# Patient Record
Sex: MALE | Race: WHITE | NOT HISPANIC OR LATINO
[De-identification: names, ages, dates, MRNs, and addresses within clinical notes are randomized per-mention and may not be internally consistent; named-entity substitution may affect disease eponyms.]

---

## 2018-07-24 ENCOUNTER — APPOINTMENT (OUTPATIENT)
Dept: UROLOGY | Facility: CLINIC | Age: 73
End: 2018-07-24
Payer: MEDICARE

## 2018-07-24 ENCOUNTER — OUTPATIENT (OUTPATIENT)
Dept: OUTPATIENT SERVICES | Facility: HOSPITAL | Age: 73
LOS: 1 days | End: 2018-07-24
Payer: MEDICARE

## 2018-07-24 VITALS
HEIGHT: 75 IN | HEART RATE: 76 BPM | DIASTOLIC BLOOD PRESSURE: 63 MMHG | BODY MASS INDEX: 31.71 KG/M2 | WEIGHT: 255 LBS | RESPIRATION RATE: 17 BRPM | SYSTOLIC BLOOD PRESSURE: 136 MMHG

## 2018-07-24 DIAGNOSIS — N13.9 OBSTRUCTIVE AND REFLUX UROPATHY, UNSPECIFIED: ICD-10-CM

## 2018-07-24 DIAGNOSIS — Z78.9 OTHER SPECIFIED HEALTH STATUS: ICD-10-CM

## 2018-07-24 PROCEDURE — 99203 OFFICE O/P NEW LOW 30 MIN: CPT | Mod: 25

## 2018-07-24 PROCEDURE — 51702 INSERT TEMP BLADDER CATH: CPT

## 2018-08-01 DIAGNOSIS — N40.1 BENIGN PROSTATIC HYPERPLASIA WITH LOWER URINARY TRACT SYMPTOMS: ICD-10-CM

## 2018-08-01 DIAGNOSIS — N13.9 OBSTRUCTIVE AND REFLUX UROPATHY, UNSPECIFIED: ICD-10-CM

## 2018-08-01 DIAGNOSIS — R35.0 FREQUENCY OF MICTURITION: ICD-10-CM

## 2018-08-01 DIAGNOSIS — R33.9 RETENTION OF URINE, UNSPECIFIED: ICD-10-CM

## 2018-08-01 DIAGNOSIS — N18.4 CHRONIC KIDNEY DISEASE, STAGE 4 (SEVERE): ICD-10-CM

## 2018-08-21 ENCOUNTER — APPOINTMENT (OUTPATIENT)
Dept: UROLOGY | Facility: CLINIC | Age: 73
End: 2018-08-21
Payer: MEDICARE

## 2018-08-21 DIAGNOSIS — N18.4 CHRONIC KIDNEY DISEASE, STAGE 4 (SEVERE): ICD-10-CM

## 2018-08-21 PROCEDURE — 99214 OFFICE O/P EST MOD 30 MIN: CPT

## 2018-08-30 PROBLEM — N18.4 CKD (CHRONIC KIDNEY DISEASE), STAGE IV: Status: ACTIVE | Noted: 2018-07-24

## 2018-08-31 ENCOUNTER — OUTPATIENT (OUTPATIENT)
Dept: OUTPATIENT SERVICES | Facility: HOSPITAL | Age: 73
LOS: 1 days | End: 2018-08-31
Payer: MEDICARE

## 2018-08-31 VITALS
HEART RATE: 76 BPM | TEMPERATURE: 98 F | RESPIRATION RATE: 16 BRPM | OXYGEN SATURATION: 98 % | SYSTOLIC BLOOD PRESSURE: 148 MMHG | HEIGHT: 75 IN | WEIGHT: 246.04 LBS | DIASTOLIC BLOOD PRESSURE: 80 MMHG

## 2018-08-31 DIAGNOSIS — Z87.19 PERSONAL HISTORY OF OTHER DISEASES OF THE DIGESTIVE SYSTEM: Chronic | ICD-10-CM

## 2018-08-31 DIAGNOSIS — Z92.89 PERSONAL HISTORY OF OTHER MEDICAL TREATMENT: ICD-10-CM

## 2018-08-31 DIAGNOSIS — H91.90 UNSPECIFIED HEARING LOSS, UNSPECIFIED EAR: ICD-10-CM

## 2018-08-31 DIAGNOSIS — N40.1 BENIGN PROSTATIC HYPERPLASIA WITH LOWER URINARY TRACT SYMPTOMS: ICD-10-CM

## 2018-08-31 DIAGNOSIS — F17.200 NICOTINE DEPENDENCE, UNSPECIFIED, UNCOMPLICATED: ICD-10-CM

## 2018-08-31 DIAGNOSIS — E66.9 OBESITY, UNSPECIFIED: ICD-10-CM

## 2018-08-31 DIAGNOSIS — Z87.438 PERSONAL HISTORY OF OTHER DISEASES OF MALE GENITAL ORGANS: Chronic | ICD-10-CM

## 2018-08-31 DIAGNOSIS — J44.9 CHRONIC OBSTRUCTIVE PULMONARY DISEASE, UNSPECIFIED: ICD-10-CM

## 2018-08-31 LAB
APPEARANCE UR: SIGNIFICANT CHANGE UP
BACTERIA # UR AUTO: NEGATIVE — SIGNIFICANT CHANGE UP
BILIRUB UR-MCNC: NEGATIVE — SIGNIFICANT CHANGE UP
BLD GP AB SCN SERPL QL: NEGATIVE — SIGNIFICANT CHANGE UP
BLOOD UR QL VISUAL: HIGH
BUN SERPL-MCNC: 40 MG/DL — HIGH (ref 7–23)
CALCIUM SERPL-MCNC: 9.4 MG/DL — SIGNIFICANT CHANGE UP (ref 8.4–10.5)
CHLORIDE SERPL-SCNC: 104 MMOL/L — SIGNIFICANT CHANGE UP (ref 98–107)
CO2 SERPL-SCNC: 21 MMOL/L — LOW (ref 22–31)
COLOR SPEC: SIGNIFICANT CHANGE UP
CREAT SERPL-MCNC: 3.09 MG/DL — HIGH (ref 0.5–1.3)
GLUCOSE SERPL-MCNC: 92 MG/DL — SIGNIFICANT CHANGE UP (ref 70–99)
GLUCOSE UR-MCNC: NEGATIVE — SIGNIFICANT CHANGE UP
HCT VFR BLD CALC: 33.2 % — LOW (ref 39–50)
HGB BLD-MCNC: 10.3 G/DL — LOW (ref 13–17)
HYALINE CASTS # UR AUTO: NEGATIVE — SIGNIFICANT CHANGE UP
KETONES UR-MCNC: NEGATIVE — SIGNIFICANT CHANGE UP
LEUKOCYTE ESTERASE UR-ACNC: SIGNIFICANT CHANGE UP
MCHC RBC-ENTMCNC: 28.7 PG — SIGNIFICANT CHANGE UP (ref 27–34)
MCHC RBC-ENTMCNC: 31 % — LOW (ref 32–36)
MCV RBC AUTO: 92.5 FL — SIGNIFICANT CHANGE UP (ref 80–100)
NITRITE UR-MCNC: POSITIVE — HIGH
NRBC # FLD: 0 — SIGNIFICANT CHANGE UP
PH UR: 6 — SIGNIFICANT CHANGE UP (ref 5–8)
PLATELET # BLD AUTO: 236 K/UL — SIGNIFICANT CHANGE UP (ref 150–400)
PMV BLD: 10.1 FL — SIGNIFICANT CHANGE UP (ref 7–13)
POTASSIUM SERPL-MCNC: 5.1 MMOL/L — SIGNIFICANT CHANGE UP (ref 3.5–5.3)
POTASSIUM SERPL-SCNC: 5.1 MMOL/L — SIGNIFICANT CHANGE UP (ref 3.5–5.3)
PROT UR-MCNC: SIGNIFICANT CHANGE UP
RBC # BLD: 3.59 M/UL — LOW (ref 4.2–5.8)
RBC # FLD: 13 % — SIGNIFICANT CHANGE UP (ref 10.3–14.5)
RBC CASTS # UR COMP ASSIST: HIGH (ref 0–?)
RH IG SCN BLD-IMP: POSITIVE — SIGNIFICANT CHANGE UP
SODIUM SERPL-SCNC: 140 MMOL/L — SIGNIFICANT CHANGE UP (ref 135–145)
SP GR SPEC: 1.02 — SIGNIFICANT CHANGE UP (ref 1–1.04)
SQUAMOUS # UR AUTO: SIGNIFICANT CHANGE UP
UROBILINOGEN FLD QL: NORMAL — SIGNIFICANT CHANGE UP
WBC # BLD: 8.46 K/UL — SIGNIFICANT CHANGE UP (ref 3.8–10.5)
WBC # FLD AUTO: 8.46 K/UL — SIGNIFICANT CHANGE UP (ref 3.8–10.5)
WBC UR QL: >50 — HIGH (ref 0–?)

## 2018-08-31 PROCEDURE — 93010 ELECTROCARDIOGRAM REPORT: CPT

## 2018-08-31 RX ORDER — SODIUM CHLORIDE 9 MG/ML
3 INJECTION INTRAMUSCULAR; INTRAVENOUS; SUBCUTANEOUS EVERY 8 HOURS
Qty: 0 | Refills: 0 | Status: DISCONTINUED | OUTPATIENT
Start: 2018-09-21 | End: 2018-09-23

## 2018-08-31 NOTE — H&P PST ADULT - NEGATIVE GENERAL GENITOURINARY SYMPTOMS
no bladder infections/no renal colic/no hematuria/no flank pain L/no flank pain R/no incontinence/no dysuria

## 2018-08-31 NOTE — H&P PST ADULT - HISTORY OF PRESENT ILLNESS
72yo male current everyday smoker with medical h/o COPD and BPH with Chin catheter x 3 weeks ago due to retention and other urinary tract symptoms. Pt presents today for presurgical testing for Cystoscopy, Transurethral Resection Prostate scheduled for 9/21/2018.

## 2018-08-31 NOTE — H&P PST ADULT - NEGATIVE NEUROLOGICAL SYMPTOMS
no focal seizures/no transient paralysis/no syncope/no tremors/no vertigo/no loss of sensation/no generalized seizures/no loss of consciousness/no paresthesias/no headache

## 2018-08-31 NOTE — H&P PST ADULT - PROBLEM SELECTOR PLAN 1
Cystoscopy, Transurethral Resection Prostate scheduled for 9/21/2018.  Pre-op instructions given. Pt verbalized understanding  Pepcid given for GI prophylaxis  Pending: Medical clearance

## 2018-08-31 NOTE — H&P PST ADULT - PMH
BPH (benign prostatic hyperplasia)    COPD (chronic obstructive pulmonary disease)    Hearing impairment    Obesity    Smoker

## 2018-09-02 LAB — SPECIMEN SOURCE: SIGNIFICANT CHANGE UP

## 2018-09-03 LAB
-  AMIKACIN: SIGNIFICANT CHANGE UP
-  AZTREONAM: SIGNIFICANT CHANGE UP
-  CEFEPIME: SIGNIFICANT CHANGE UP
-  CEFTAZIDIME: SIGNIFICANT CHANGE UP
-  CEFTRIAXONE: SIGNIFICANT CHANGE UP
-  CIPROFLOXACIN: SIGNIFICANT CHANGE UP
-  GENTAMICIN: SIGNIFICANT CHANGE UP
-  IMIPENEM: SIGNIFICANT CHANGE UP
-  LEVOFLOXACIN: SIGNIFICANT CHANGE UP
-  MEROPENEM: SIGNIFICANT CHANGE UP
-  PIPERACILLIN/TAZOBACTAM: SIGNIFICANT CHANGE UP
-  TOBRAMYCIN: SIGNIFICANT CHANGE UP
-  TRIMETHOPRIM/SULFAMETHOXAZOLE: SIGNIFICANT CHANGE UP
BACTERIA UR CULT: SIGNIFICANT CHANGE UP
METHOD TYPE: SIGNIFICANT CHANGE UP
ORGANISM # SPEC MICROSCOPIC CNT: SIGNIFICANT CHANGE UP
ORGANISM # SPEC MICROSCOPIC CNT: SIGNIFICANT CHANGE UP

## 2018-09-20 ENCOUNTER — TRANSCRIPTION ENCOUNTER (OUTPATIENT)
Age: 73
End: 2018-09-20

## 2018-09-21 ENCOUNTER — APPOINTMENT (OUTPATIENT)
Dept: UROLOGY | Facility: HOSPITAL | Age: 73
End: 2018-09-21

## 2018-09-21 ENCOUNTER — RESULT REVIEW (OUTPATIENT)
Age: 73
End: 2018-09-21

## 2018-09-21 ENCOUNTER — INPATIENT (INPATIENT)
Facility: HOSPITAL | Age: 73
LOS: 1 days | Discharge: HOME | End: 2018-09-23
Attending: UROLOGY | Admitting: UROLOGY
Payer: MEDICARE

## 2018-09-21 VITALS
RESPIRATION RATE: 16 BRPM | DIASTOLIC BLOOD PRESSURE: 86 MMHG | HEIGHT: 75 IN | HEART RATE: 80 BPM | SYSTOLIC BLOOD PRESSURE: 114 MMHG | OXYGEN SATURATION: 98 % | TEMPERATURE: 98 F | WEIGHT: 246.04 LBS

## 2018-09-21 DIAGNOSIS — N40.1 BENIGN PROSTATIC HYPERPLASIA WITH LOWER URINARY TRACT SYMPTOMS: ICD-10-CM

## 2018-09-21 DIAGNOSIS — Z87.438 PERSONAL HISTORY OF OTHER DISEASES OF MALE GENITAL ORGANS: Chronic | ICD-10-CM

## 2018-09-21 DIAGNOSIS — Z87.19 PERSONAL HISTORY OF OTHER DISEASES OF THE DIGESTIVE SYSTEM: Chronic | ICD-10-CM

## 2018-09-21 DIAGNOSIS — N40.0 BENIGN PROSTATIC HYPERPLASIA WITHOUT LOWER URINARY TRACT SYMPTOMS: ICD-10-CM

## 2018-09-21 LAB
BASE EXCESS BLDV CALC-SCNC: -17.8 MMOL/L — SIGNIFICANT CHANGE UP
BASE EXCESS BLDV CALC-SCNC: -7 MMOL/L — SIGNIFICANT CHANGE UP
BASOPHILS # BLD AUTO: 0.06 K/UL — SIGNIFICANT CHANGE UP (ref 0–0.2)
BASOPHILS NFR BLD AUTO: 0.9 % — SIGNIFICANT CHANGE UP (ref 0–2)
BLD GP AB SCN SERPL QL: NEGATIVE — SIGNIFICANT CHANGE UP
BUN SERPL-MCNC: 36 MG/DL — HIGH (ref 7–23)
CA-I SERPL-SCNC: 1.08 MMOL/L — LOW (ref 1.15–1.29)
CA-I SERPL-SCNC: 1.22 MMOL/L — SIGNIFICANT CHANGE UP (ref 1.15–1.29)
CALCIUM SERPL-MCNC: 8.6 MG/DL — SIGNIFICANT CHANGE UP (ref 8.4–10.5)
CHLORIDE SERPL-SCNC: 107 MMOL/L — SIGNIFICANT CHANGE UP (ref 98–107)
CO2 SERPL-SCNC: 22 MMOL/L — SIGNIFICANT CHANGE UP (ref 22–31)
CREAT SERPL-MCNC: 3.19 MG/DL — HIGH (ref 0.5–1.3)
EOSINOPHIL # BLD AUTO: 0.12 K/UL — SIGNIFICANT CHANGE UP (ref 0–0.5)
EOSINOPHIL NFR BLD AUTO: 1.9 % — SIGNIFICANT CHANGE UP (ref 0–6)
GAS PNL BLDV: 127 MMOL/L — LOW (ref 136–146)
GAS PNL BLDV: 136 MMOL/L — SIGNIFICANT CHANGE UP (ref 136–146)
GLUCOSE BLDV-MCNC: 38 — CRITICAL LOW (ref 70–99)
GLUCOSE BLDV-MCNC: 83 — SIGNIFICANT CHANGE UP (ref 70–99)
GLUCOSE SERPL-MCNC: 96 MG/DL — SIGNIFICANT CHANGE UP (ref 70–99)
HCO3 BLDV-SCNC: 10 MMOL/L — LOW (ref 20–27)
HCO3 BLDV-SCNC: 19 MMOL/L — LOW (ref 20–27)
HCT VFR BLD CALC: 31.5 % — LOW (ref 39–50)
HCT VFR BLDV CALC: 12.3 % — CRITICAL LOW (ref 39–51)
HCT VFR BLDV CALC: 28.7 % — LOW (ref 39–51)
HGB BLD-MCNC: 10 G/DL — LOW (ref 13–17)
HGB BLDV-MCNC: 3.8 G/DL — CRITICAL LOW (ref 13–17)
HGB BLDV-MCNC: 9.3 G/DL — LOW (ref 13–17)
IMM GRANULOCYTES # BLD AUTO: 0.03 # — SIGNIFICANT CHANGE UP
IMM GRANULOCYTES NFR BLD AUTO: 0.5 % — SIGNIFICANT CHANGE UP (ref 0–1.5)
LYMPHOCYTES # BLD AUTO: 1.43 K/UL — SIGNIFICANT CHANGE UP (ref 1–3.3)
LYMPHOCYTES # BLD AUTO: 22.6 % — SIGNIFICANT CHANGE UP (ref 13–44)
MCHC RBC-ENTMCNC: 29.3 PG — SIGNIFICANT CHANGE UP (ref 27–34)
MCHC RBC-ENTMCNC: 31.7 % — LOW (ref 32–36)
MCV RBC AUTO: 92.4 FL — SIGNIFICANT CHANGE UP (ref 80–100)
MONOCYTES # BLD AUTO: 0.42 K/UL — SIGNIFICANT CHANGE UP (ref 0–0.9)
MONOCYTES NFR BLD AUTO: 6.6 % — SIGNIFICANT CHANGE UP (ref 2–14)
NEUTROPHILS # BLD AUTO: 4.26 K/UL — SIGNIFICANT CHANGE UP (ref 1.8–7.4)
NEUTROPHILS NFR BLD AUTO: 67.5 % — SIGNIFICANT CHANGE UP (ref 43–77)
NRBC # FLD: 0 — SIGNIFICANT CHANGE UP
PCO2 BLDV: 20 MMHG — LOW (ref 41–51)
PCO2 BLDV: 43 MMHG — SIGNIFICANT CHANGE UP (ref 41–51)
PH BLDV: 7.24 PH — LOW (ref 7.32–7.43)
PH BLDV: 7.26 PH — LOW (ref 7.32–7.43)
PLATELET # BLD AUTO: 182 K/UL — SIGNIFICANT CHANGE UP (ref 150–400)
PMV BLD: 9.3 FL — SIGNIFICANT CHANGE UP (ref 7–13)
PO2 BLDV: 210 MMHG — HIGH (ref 35–40)
PO2 BLDV: 213 MMHG — HIGH (ref 35–40)
POTASSIUM BLDV-SCNC: 4.2 MMOL/L — SIGNIFICANT CHANGE UP (ref 3.4–4.5)
POTASSIUM BLDV-SCNC: 4.9 MMOL/L — HIGH (ref 3.4–4.5)
POTASSIUM SERPL-MCNC: 5.4 MMOL/L — HIGH (ref 3.5–5.3)
POTASSIUM SERPL-SCNC: 5.4 MMOL/L — HIGH (ref 3.5–5.3)
RBC # BLD: 3.41 M/UL — LOW (ref 4.2–5.8)
RBC # FLD: 13.1 % — SIGNIFICANT CHANGE UP (ref 10.3–14.5)
RH IG SCN BLD-IMP: POSITIVE — SIGNIFICANT CHANGE UP
SAO2 % BLDV: 99.4 % — HIGH (ref 60–85)
SAO2 % BLDV: 99.9 % — HIGH (ref 60–85)
SODIUM SERPL-SCNC: 139 MMOL/L — SIGNIFICANT CHANGE UP (ref 135–145)
WBC # BLD: 6.32 K/UL — SIGNIFICANT CHANGE UP (ref 3.8–10.5)
WBC # FLD AUTO: 6.32 K/UL — SIGNIFICANT CHANGE UP (ref 3.8–10.5)

## 2018-09-21 PROCEDURE — 88305 TISSUE EXAM BY PATHOLOGIST: CPT | Mod: 26

## 2018-09-21 RX ORDER — METOCLOPRAMIDE HCL 10 MG
10 TABLET ORAL ONCE
Qty: 0 | Refills: 0 | Status: DISCONTINUED | OUTPATIENT
Start: 2018-09-21 | End: 2018-09-21

## 2018-09-21 RX ORDER — ALBUTEROL 90 UG/1
2 AEROSOL, METERED ORAL
Qty: 0 | Refills: 0 | COMMUNITY

## 2018-09-21 RX ORDER — ALBUTEROL 90 UG/1
2 AEROSOL, METERED ORAL EVERY 6 HOURS
Qty: 0 | Refills: 0 | Status: DISCONTINUED | OUTPATIENT
Start: 2018-09-21 | End: 2018-09-23

## 2018-09-21 RX ORDER — ONDANSETRON 8 MG/1
4 TABLET, FILM COATED ORAL ONCE
Qty: 0 | Refills: 0 | Status: DISCONTINUED | OUTPATIENT
Start: 2018-09-21 | End: 2018-09-23

## 2018-09-21 RX ORDER — ACETAMINOPHEN 500 MG
650 TABLET ORAL EVERY 6 HOURS
Qty: 0 | Refills: 0 | Status: DISCONTINUED | OUTPATIENT
Start: 2018-09-21 | End: 2018-09-23

## 2018-09-21 RX ORDER — SODIUM CHLORIDE 9 MG/ML
1000 INJECTION, SOLUTION INTRAVENOUS
Qty: 0 | Refills: 0 | Status: DISCONTINUED | OUTPATIENT
Start: 2018-09-21 | End: 2018-09-22

## 2018-09-21 RX ORDER — DOCUSATE SODIUM 100 MG
100 CAPSULE ORAL THREE TIMES A DAY
Qty: 0 | Refills: 0 | Status: DISCONTINUED | OUTPATIENT
Start: 2018-09-21 | End: 2018-09-23

## 2018-09-21 RX ORDER — OXYCODONE AND ACETAMINOPHEN 5; 325 MG/1; MG/1
1 TABLET ORAL EVERY 4 HOURS
Qty: 0 | Refills: 0 | Status: DISCONTINUED | OUTPATIENT
Start: 2018-09-21 | End: 2018-09-23

## 2018-09-21 RX ORDER — INFLUENZA VIRUS VACCINE 15; 15; 15; 15 UG/.5ML; UG/.5ML; UG/.5ML; UG/.5ML
0.5 SUSPENSION INTRAMUSCULAR ONCE
Qty: 0 | Refills: 0 | Status: COMPLETED | OUTPATIENT
Start: 2018-09-21 | End: 2018-09-21

## 2018-09-21 RX ORDER — ATROPA BELLADONNA AND OPIUM 16.2; 6 MG/1; MG/1
1 SUPPOSITORY RECTAL EVERY 8 HOURS
Qty: 0 | Refills: 0 | Status: DISCONTINUED | OUTPATIENT
Start: 2018-09-21 | End: 2018-09-23

## 2018-09-21 RX ORDER — ONDANSETRON 8 MG/1
4 TABLET, FILM COATED ORAL EVERY 6 HOURS
Qty: 0 | Refills: 0 | Status: DISCONTINUED | OUTPATIENT
Start: 2018-09-21 | End: 2018-09-23

## 2018-09-21 RX ORDER — HYDROMORPHONE HYDROCHLORIDE 2 MG/ML
0.5 INJECTION INTRAMUSCULAR; INTRAVENOUS; SUBCUTANEOUS
Qty: 0 | Refills: 0 | Status: DISCONTINUED | OUTPATIENT
Start: 2018-09-21 | End: 2018-09-23

## 2018-09-21 RX ORDER — FENTANYL CITRATE 50 UG/ML
25 INJECTION INTRAVENOUS
Qty: 0 | Refills: 0 | Status: DISCONTINUED | OUTPATIENT
Start: 2018-09-21 | End: 2018-09-21

## 2018-09-21 RX ORDER — OXYCODONE AND ACETAMINOPHEN 5; 325 MG/1; MG/1
2 TABLET ORAL EVERY 4 HOURS
Qty: 0 | Refills: 0 | Status: DISCONTINUED | OUTPATIENT
Start: 2018-09-21 | End: 2018-09-23

## 2018-09-21 RX ORDER — PIPERACILLIN AND TAZOBACTAM 4; .5 G/20ML; G/20ML
3.38 INJECTION, POWDER, LYOPHILIZED, FOR SOLUTION INTRAVENOUS ONCE
Qty: 0 | Refills: 0 | Status: COMPLETED | OUTPATIENT
Start: 2018-09-21 | End: 2018-09-21

## 2018-09-21 RX ORDER — HEPARIN SODIUM 5000 [USP'U]/ML
5000 INJECTION INTRAVENOUS; SUBCUTANEOUS EVERY 8 HOURS
Qty: 0 | Refills: 0 | Status: DISCONTINUED | OUTPATIENT
Start: 2018-09-21 | End: 2018-09-23

## 2018-09-21 RX ORDER — SILODOSIN 4 MG/1
1 CAPSULE ORAL
Qty: 0 | Refills: 0 | COMMUNITY

## 2018-09-21 RX ORDER — SODIUM CHLORIDE 9 MG/ML
1000 INJECTION, SOLUTION INTRAVENOUS
Qty: 0 | Refills: 0 | Status: DISCONTINUED | OUTPATIENT
Start: 2018-09-21 | End: 2018-09-21

## 2018-09-21 RX ORDER — SENNA PLUS 8.6 MG/1
1 TABLET ORAL AT BEDTIME
Qty: 0 | Refills: 0 | Status: DISCONTINUED | OUTPATIENT
Start: 2018-09-21 | End: 2018-09-23

## 2018-09-21 RX ORDER — PIPERACILLIN AND TAZOBACTAM 4; .5 G/20ML; G/20ML
3.38 INJECTION, POWDER, LYOPHILIZED, FOR SOLUTION INTRAVENOUS EVERY 8 HOURS
Qty: 0 | Refills: 0 | Status: DISCONTINUED | OUTPATIENT
Start: 2018-09-21 | End: 2018-09-23

## 2018-09-21 RX ORDER — FENTANYL CITRATE 50 UG/ML
50 INJECTION INTRAVENOUS
Qty: 0 | Refills: 0 | Status: DISCONTINUED | OUTPATIENT
Start: 2018-09-21 | End: 2018-09-21

## 2018-09-21 RX ADMIN — FENTANYL CITRATE 50 MICROGRAM(S): 50 INJECTION INTRAVENOUS at 15:30

## 2018-09-21 RX ADMIN — FENTANYL CITRATE 50 MICROGRAM(S): 50 INJECTION INTRAVENOUS at 15:15

## 2018-09-21 RX ADMIN — SODIUM CHLORIDE 125 MILLILITER(S): 9 INJECTION, SOLUTION INTRAVENOUS at 15:30

## 2018-09-21 RX ADMIN — ATROPA BELLADONNA AND OPIUM 1 SUPPOSITORY(S): 16.2; 6 SUPPOSITORY RECTAL at 16:06

## 2018-09-21 RX ADMIN — Medication 100 MILLIGRAM(S): at 21:49

## 2018-09-21 RX ADMIN — ATROPA BELLADONNA AND OPIUM 1 SUPPOSITORY(S): 16.2; 6 SUPPOSITORY RECTAL at 16:44

## 2018-09-21 RX ADMIN — SODIUM CHLORIDE 3 MILLILITER(S): 9 INJECTION INTRAMUSCULAR; INTRAVENOUS; SUBCUTANEOUS at 21:49

## 2018-09-21 RX ADMIN — HYDROMORPHONE HYDROCHLORIDE 0.5 MILLIGRAM(S): 2 INJECTION INTRAMUSCULAR; INTRAVENOUS; SUBCUTANEOUS at 16:05

## 2018-09-21 RX ADMIN — HYDROMORPHONE HYDROCHLORIDE 0.5 MILLIGRAM(S): 2 INJECTION INTRAMUSCULAR; INTRAVENOUS; SUBCUTANEOUS at 16:20

## 2018-09-21 RX ADMIN — PIPERACILLIN AND TAZOBACTAM 25 GRAM(S): 4; .5 INJECTION, POWDER, LYOPHILIZED, FOR SOLUTION INTRAVENOUS at 21:49

## 2018-09-21 RX ADMIN — HEPARIN SODIUM 5000 UNIT(S): 5000 INJECTION INTRAVENOUS; SUBCUTANEOUS at 21:49

## 2018-09-21 RX ADMIN — SODIUM CHLORIDE 125 MILLILITER(S): 9 INJECTION, SOLUTION INTRAVENOUS at 21:49

## 2018-09-21 NOTE — PROGRESS NOTE ADULT - SUBJECTIVE AND OBJECTIVE BOX
Note    Post op Check    s/p : TURP    Pt seen / examined without complaints pain controlled    Vital Signs Last 24 Hrs  T(C): 36.7 (21 Sep 2018 21:33), Max: 36.7 (21 Sep 2018 21:33)  T(F): 98 (21 Sep 2018 21:33), Max: 98 (21 Sep 2018 21:33)  HR: 58 (21 Sep 2018 21:33) (54 - 80)  BP: 131/59 (21 Sep 2018 21:33) (114/86 - 159/77)  BP(mean): --  RR: 16 (21 Sep 2018 21:33) (10 - 23)  SpO2: 95% (21 Sep 2018 21:33) (95% - 100%)    I&O's Summary    21 Sep 2018 07:01  -  21 Sep 2018 22:47  --------------------------------------------------------  IN: 820 mL / OUT: 0 mL / NET: 820 mL    CBI = yellow pink    PHYSICAL EXAM:       Constitutional: awake alert NAD    Respiratory: no resp distress    Cardiovascular: RRR    Gastrointestinal: soft, NT ND    Genitourinary: nelson in place draining well CBI in progress    Extremities: (+) venodynes                              10.0   6.32  )-----------( 182      ( 21 Sep 2018 15:56 )             31.5       09-21    139  |  107  |  36<H>  ----------------------------<  96  5.4<H>   |  22  |  3.19<H>    Ca    8.6      21 Sep 2018 15:56

## 2018-09-21 NOTE — BRIEF OPERATIVE NOTE - PROCEDURE
<<-----Click on this checkbox to enter Procedure TURP (transurethral resection of prostate)  09/21/2018    Active  MVIRA

## 2018-09-21 NOTE — PROGRESS NOTE ADULT - PROBLEM SELECTOR PLAN 1
Strict I&O's / CBI  Analgesia Antiemetics  DVT prophylaxis  Incentive spirometry  Reg / OOB  AM labs

## 2018-09-22 LAB
BUN SERPL-MCNC: 35 MG/DL — HIGH (ref 7–23)
CALCIUM SERPL-MCNC: 8.6 MG/DL — SIGNIFICANT CHANGE UP (ref 8.4–10.5)
CHLORIDE SERPL-SCNC: 106 MMOL/L — SIGNIFICANT CHANGE UP (ref 98–107)
CO2 SERPL-SCNC: 20 MMOL/L — LOW (ref 22–31)
CREAT SERPL-MCNC: 3.25 MG/DL — HIGH (ref 0.5–1.3)
GLUCOSE SERPL-MCNC: 89 MG/DL — SIGNIFICANT CHANGE UP (ref 70–99)
HCT VFR BLD CALC: 33 % — LOW (ref 39–50)
HGB BLD-MCNC: 10.4 G/DL — LOW (ref 13–17)
MCHC RBC-ENTMCNC: 28.7 PG — SIGNIFICANT CHANGE UP (ref 27–34)
MCHC RBC-ENTMCNC: 31.5 % — LOW (ref 32–36)
MCV RBC AUTO: 91.2 FL — SIGNIFICANT CHANGE UP (ref 80–100)
NRBC # FLD: 0 — SIGNIFICANT CHANGE UP
PLATELET # BLD AUTO: 186 K/UL — SIGNIFICANT CHANGE UP (ref 150–400)
PMV BLD: 9.5 FL — SIGNIFICANT CHANGE UP (ref 7–13)
POTASSIUM SERPL-MCNC: 5.1 MMOL/L — SIGNIFICANT CHANGE UP (ref 3.5–5.3)
POTASSIUM SERPL-SCNC: 5.1 MMOL/L — SIGNIFICANT CHANGE UP (ref 3.5–5.3)
RBC # BLD: 3.62 M/UL — LOW (ref 4.2–5.8)
RBC # FLD: 13.1 % — SIGNIFICANT CHANGE UP (ref 10.3–14.5)
SODIUM SERPL-SCNC: 140 MMOL/L — SIGNIFICANT CHANGE UP (ref 135–145)
SPECIMEN SOURCE: SIGNIFICANT CHANGE UP
WBC # BLD: 5.73 K/UL — SIGNIFICANT CHANGE UP (ref 3.8–10.5)
WBC # FLD AUTO: 5.73 K/UL — SIGNIFICANT CHANGE UP (ref 3.8–10.5)

## 2018-09-22 RX ORDER — SODIUM CHLORIDE 9 MG/ML
1000 INJECTION, SOLUTION INTRAVENOUS
Qty: 0 | Refills: 0 | Status: DISCONTINUED | OUTPATIENT
Start: 2018-09-22 | End: 2018-09-22

## 2018-09-22 RX ORDER — NICOTINE POLACRILEX 2 MG
1 GUM BUCCAL DAILY
Qty: 0 | Refills: 0 | Status: DISCONTINUED | OUTPATIENT
Start: 2018-09-22 | End: 2018-09-22

## 2018-09-22 RX ORDER — NICOTINE POLACRILEX 2 MG
2 GUM BUCCAL DAILY
Qty: 0 | Refills: 0 | Status: DISCONTINUED | OUTPATIENT
Start: 2018-09-22 | End: 2018-09-23

## 2018-09-22 RX ADMIN — SODIUM CHLORIDE 125 MILLILITER(S): 9 INJECTION, SOLUTION INTRAVENOUS at 06:08

## 2018-09-22 RX ADMIN — SODIUM CHLORIDE 75 MILLILITER(S): 9 INJECTION, SOLUTION INTRAVENOUS at 13:34

## 2018-09-22 RX ADMIN — OXYCODONE AND ACETAMINOPHEN 2 TABLET(S): 5; 325 TABLET ORAL at 22:08

## 2018-09-22 RX ADMIN — SODIUM CHLORIDE 3 MILLILITER(S): 9 INJECTION INTRAMUSCULAR; INTRAVENOUS; SUBCUTANEOUS at 21:27

## 2018-09-22 RX ADMIN — SODIUM CHLORIDE 75 MILLILITER(S): 9 INJECTION, SOLUTION INTRAVENOUS at 07:42

## 2018-09-22 RX ADMIN — OXYCODONE AND ACETAMINOPHEN 2 TABLET(S): 5; 325 TABLET ORAL at 21:23

## 2018-09-22 RX ADMIN — Medication 100 MILLIGRAM(S): at 13:33

## 2018-09-22 RX ADMIN — Medication 100 MILLIGRAM(S): at 06:08

## 2018-09-22 RX ADMIN — HEPARIN SODIUM 5000 UNIT(S): 5000 INJECTION INTRAVENOUS; SUBCUTANEOUS at 13:33

## 2018-09-22 RX ADMIN — PIPERACILLIN AND TAZOBACTAM 25 GRAM(S): 4; .5 INJECTION, POWDER, LYOPHILIZED, FOR SOLUTION INTRAVENOUS at 06:09

## 2018-09-22 RX ADMIN — PIPERACILLIN AND TAZOBACTAM 25 GRAM(S): 4; .5 INJECTION, POWDER, LYOPHILIZED, FOR SOLUTION INTRAVENOUS at 13:40

## 2018-09-22 RX ADMIN — HEPARIN SODIUM 5000 UNIT(S): 5000 INJECTION INTRAVENOUS; SUBCUTANEOUS at 21:27

## 2018-09-22 RX ADMIN — SODIUM CHLORIDE 3 MILLILITER(S): 9 INJECTION INTRAMUSCULAR; INTRAVENOUS; SUBCUTANEOUS at 06:05

## 2018-09-22 RX ADMIN — PIPERACILLIN AND TAZOBACTAM 25 GRAM(S): 4; .5 INJECTION, POWDER, LYOPHILIZED, FOR SOLUTION INTRAVENOUS at 21:27

## 2018-09-22 RX ADMIN — SODIUM CHLORIDE 3 MILLILITER(S): 9 INJECTION INTRAMUSCULAR; INTRAVENOUS; SUBCUTANEOUS at 13:40

## 2018-09-22 NOTE — PROGRESS NOTE ADULT - SUBJECTIVE AND OBJECTIVE BOX
Overnight events:  None    Subjective:  Pt c/o "whole body discomfort", no N/V, tolerating diet    Objective:    Vital signs  T(C): , Max: 37.1 (09-22-18 @ 09:35)  HR: 66 (09-22-18 @ 09:35)  BP: 132/69 (09-22-18 @ 09:35)  SpO2: 97% (09-22-18 @ 09:35)  Wt(kg): --    Output   CBI: clear      Gen: NAD  Abd: soft, nontender, no suprapubic tenderness or fullness  : ? chronic urethral erosion, nelson irrigates easily, no clots    Labs                        10.4   5.73  )-----------( 186      ( 22 Sep 2018 06:30 )             33.0     22 Sep 2018 06:30    140    |  106    |  35     ----------------------------<  89     5.1     |  20     |  3.25     Ca    8.6        22 Sep 2018 06:30

## 2018-09-22 NOTE — PROGRESS NOTE ADULT - SUBJECTIVE AND OBJECTIVE BOX
ANESTHESIA POSTOP CHECK    73y Male POSTOP DAY 1 S/P     Vital Signs Last 24 Hrs  T(C): 37.1 (22 Sep 2018 09:35), Max: 37.1 (22 Sep 2018 09:35)  T(F): 98.8 (22 Sep 2018 09:35), Max: 98.8 (22 Sep 2018 09:35)  HR: 66 (22 Sep 2018 09:35) (54 - 80)  BP: 132/69 (22 Sep 2018 09:35) (114/86 - 159/77)  BP(mean): --  RR: 17 (22 Sep 2018 09:35) (10 - 23)  SpO2: 97% (22 Sep 2018 09:35) (95% - 100%)  I&O's Summary    21 Sep 2018 07:01  -  22 Sep 2018 07:00  --------------------------------------------------------  IN: 820 mL / OUT: 0 mL / NET: 820 mL    22 Sep 2018 07:01  -  22 Sep 2018 09:47  --------------------------------------------------------  IN: 6000 mL / OUT: 6600 mL / NET: -600 mL      General anesthesia for turp cystoscopy on 9/21/2018   NO APPARENT ANESTHESIA COMPLICATIONS      Comments:

## 2018-09-22 NOTE — PROGRESS NOTE ADULT - PROBLEM SELECTOR PLAN 1
IVM  Continue CBI, wean as tolerated, monitor color  AM labs reviewed  Continue Zosyn  DVT prophy, IS  OOB, ambulate IVM  Continue CBI, wean as tolerated, monitor color  AM labs reviewed  Continue Zosyn  Nicoderm patch  DVT prophy, IS  OOB, ambulate IVM  Continue CBI, wean as tolerated, monitor color  AM labs reviewed  F/U OR Cx: NGTD  Continue Zosyn  Nicoderm patch  DVT prophy, IS  OOB, ambulate

## 2018-09-23 ENCOUNTER — TRANSCRIPTION ENCOUNTER (OUTPATIENT)
Age: 73
End: 2018-09-23

## 2018-09-23 VITALS
HEART RATE: 76 BPM | DIASTOLIC BLOOD PRESSURE: 62 MMHG | SYSTOLIC BLOOD PRESSURE: 129 MMHG | TEMPERATURE: 98 F | OXYGEN SATURATION: 97 % | RESPIRATION RATE: 16 BRPM

## 2018-09-23 LAB — BACTERIA UR CULT: SIGNIFICANT CHANGE UP

## 2018-09-23 RX ORDER — SENNA PLUS 8.6 MG/1
1 TABLET ORAL
Qty: 0 | Refills: 0 | COMMUNITY
Start: 2018-09-23

## 2018-09-23 RX ORDER — CIPROFLOXACIN LACTATE 400MG/40ML
1 VIAL (ML) INTRAVENOUS
Qty: 3 | Refills: 0 | OUTPATIENT
Start: 2018-09-23 | End: 2018-09-25

## 2018-09-23 RX ORDER — DOCUSATE SODIUM 100 MG
1 CAPSULE ORAL
Qty: 0 | Refills: 0 | COMMUNITY
Start: 2018-09-23

## 2018-09-23 RX ADMIN — SODIUM CHLORIDE 3 MILLILITER(S): 9 INJECTION INTRAMUSCULAR; INTRAVENOUS; SUBCUTANEOUS at 06:13

## 2018-09-23 RX ADMIN — ALBUTEROL 2 PUFF(S): 90 AEROSOL, METERED ORAL at 09:32

## 2018-09-23 RX ADMIN — SODIUM CHLORIDE 3 MILLILITER(S): 9 INJECTION INTRAMUSCULAR; INTRAVENOUS; SUBCUTANEOUS at 13:08

## 2018-09-23 RX ADMIN — PIPERACILLIN AND TAZOBACTAM 25 GRAM(S): 4; .5 INJECTION, POWDER, LYOPHILIZED, FOR SOLUTION INTRAVENOUS at 06:12

## 2018-09-23 RX ADMIN — OXYCODONE AND ACETAMINOPHEN 2 TABLET(S): 5; 325 TABLET ORAL at 13:20

## 2018-09-23 RX ADMIN — OXYCODONE AND ACETAMINOPHEN 2 TABLET(S): 5; 325 TABLET ORAL at 12:23

## 2018-09-23 RX ADMIN — HEPARIN SODIUM 5000 UNIT(S): 5000 INJECTION INTRAVENOUS; SUBCUTANEOUS at 06:12

## 2018-09-23 NOTE — DISCHARGE NOTE ADULT - INSTRUCTIONS
Return to normal diet, stay well hydrated Call MD if fever >101, persistent nausea/vomiting, pain not relieved by pain medicine, if urine becomes bright red or full of clots. Take over the counter stool softeners to prevent constipation. Drink plenty of fluids. Empty nelson as instructed. Make follow up apt with PCP.

## 2018-09-23 NOTE — DISCHARGE NOTE ADULT - CARE PLAN
Principal Discharge DX:	Benign prostatic hyperplasia with lower urinary tract symptoms, symptom details unspecified  Goal:	Return to normal function  Assessment and plan of treatment:	Return to normal function

## 2018-09-23 NOTE — DISCHARGE NOTE ADULT - PATIENT PORTAL LINK FT
You can access the [facility] Patient Portal, offered by [facility], by registering with the following website: [email or web address]

## 2018-09-23 NOTE — DISCHARGE NOTE ADULT - ADDITIONAL INSTRUCTIONS
Follow up with Dr Olsen next Thursday for catheter removal. Call office at [phone] tomorrow to schedule appointment.

## 2018-09-23 NOTE — DISCHARGE NOTE ADULT - HOSPITAL COURSE
73 year old man who presented for TURP yesterday, procedure as planned, post operative bleeding requiring observation and CBI. Successfully weaned with clear urine on POD2, discharged home with nelson in stable condition. Please follow up on Thursday for catheter removal.

## 2018-09-23 NOTE — DISCHARGE NOTE ADULT - CARE PROVIDER_API CALL
Александр Olsen), Urology  [address] [place] [address]  Phone: [phone]  Fax: [phone]

## 2018-09-23 NOTE — PROGRESS NOTE ADULT - SUBJECTIVE AND OBJECTIVE BOX
Subjective  No overnight events. Pt w/o bladder spasms, wants to go home.    Objective    Vital signs  T(F): , Max: 98.1 (09-22-18 @ 16:49)  HR: 69 (09-23-18 @ 06:06)  BP: 129/87 (09-23-18 @ 06:06)  SpO2: 99% (09-23-18 @ 06:06)  Wt(kg): --    Output     09-22 @ 07:01  -  09-23 @ 07:00  --------------------------------------------------------  IN: 6000 mL / OUT: 6600 mL / NET: -600 mL        Gen: NAD  Abd: soft, NT, nd  : output clear clear pink off CBI    Labs      09-22 @ 06:30    WBC 5.73  / Hct 33.0  / SCr 3.25     09-21 @ 15:56    WBC 6.32  / Hct 31.5  / SCr 3.19

## 2018-09-23 NOTE — PROGRESS NOTE ADULT - PROBLEM SELECTOR PLAN 1
clear pink off CBI  F/U OR Cx: NGTD  Continue Zosyn  Nicoderm patch  DVT prophy, IS  OOB, ambulate  dispo planning w/leslie and cipro

## 2018-09-23 NOTE — PROGRESS NOTE ADULT - ATTENDING COMMENTS
On call attending covering for Dr Olsen. Pt seen and examined. Agree with above and edited as appropriate.
On call attending covering for Dr Olsen. Pt seen and examined. Agree with above and edited as appropriate.

## 2018-09-23 NOTE — DISCHARGE NOTE ADULT - MEDICATION SUMMARY - MEDICATIONS TO TAKE
I will START or STAY ON the medications listed below when I get home from the hospital:    Ventolin 90 mcg/inh inhalation aerosol  -- 2 puffs prn  -- Indication: For home    docusate sodium 100 mg oral capsule  -- 1 cap(s) by mouth 3 times a day  -- Indication: For Stool softener    senna oral tablet  -- 1 tab(s) by mouth once a day (at bedtime)  -- Indication: For Stool softener    Cipro  mg oral tablet, extended release  -- 1 tab(s) by mouth once a day   -- Avoid prolonged or excessive exposure to direct and/or artificial sunlight while taking this medication.  Check with your doctor before becoming pregnant.  Do not take dairy products, antacids, or iron preparations within one hour of this medication.  Finish all this medication unless otherwise directed by prescriber.  It is very important that you take or use this exactly as directed.  Do not skip doses or discontinue unless directed by your doctor.  Medication should be taken with plenty of water.  Obtain medical advice before taking any non-prescription drugs as some may affect the action of this medication.  Swallow whole.  Do not crush.  This drug may impair the ability to drive or operate machinery.  Use care until you become familiar with its effects.    -- Indication: For Infection prevention

## 2018-09-24 ENCOUNTER — CLINICAL ADVICE (OUTPATIENT)
Age: 73
End: 2018-09-24

## 2018-09-24 PROBLEM — F17.200 NICOTINE DEPENDENCE, UNSPECIFIED, UNCOMPLICATED: Chronic | Status: ACTIVE | Noted: 2018-08-31

## 2018-09-24 PROBLEM — N40.0 BENIGN PROSTATIC HYPERPLASIA WITHOUT LOWER URINARY TRACT SYMPTOMS: Chronic | Status: ACTIVE | Noted: 2018-08-31

## 2018-09-24 PROBLEM — J44.9 CHRONIC OBSTRUCTIVE PULMONARY DISEASE, UNSPECIFIED: Chronic | Status: ACTIVE | Noted: 2018-08-31

## 2018-09-24 PROBLEM — E66.9 OBESITY, UNSPECIFIED: Chronic | Status: ACTIVE | Noted: 2018-08-31

## 2018-09-24 PROBLEM — H91.90 UNSPECIFIED HEARING LOSS, UNSPECIFIED EAR: Chronic | Status: ACTIVE | Noted: 2018-08-31

## 2018-09-24 LAB — SPECIMEN SOURCE: SIGNIFICANT CHANGE UP

## 2018-09-24 RX ORDER — CIPROFLOXACIN HYDROCHLORIDE 500 MG/1
500 TABLET, FILM COATED ORAL TWICE DAILY
Qty: 10 | Refills: 0 | Status: DISCONTINUED | COMMUNITY
Start: 2018-09-06 | End: 2018-09-24

## 2018-09-24 RX ORDER — CIPROFLOXACIN HYDROCHLORIDE 250 MG/1
250 TABLET, FILM COATED ORAL
Qty: 6 | Refills: 0 | Status: COMPLETED | COMMUNITY
Start: 2018-09-24 | End: 2018-09-27

## 2018-09-26 LAB — SURGICAL PATHOLOGY STUDY: SIGNIFICANT CHANGE UP

## 2018-09-27 ENCOUNTER — APPOINTMENT (OUTPATIENT)
Dept: UROLOGY | Facility: CLINIC | Age: 73
End: 2018-09-27
Payer: MEDICARE

## 2018-09-27 VITALS
BODY MASS INDEX: 31.71 KG/M2 | HEIGHT: 75 IN | SYSTOLIC BLOOD PRESSURE: 154 MMHG | WEIGHT: 255 LBS | HEART RATE: 89 BPM | TEMPERATURE: 98.1 F | DIASTOLIC BLOOD PRESSURE: 77 MMHG | RESPIRATION RATE: 17 BRPM

## 2018-09-27 PROCEDURE — 99024 POSTOP FOLLOW-UP VISIT: CPT

## 2018-10-22 LAB — FUNGUS SPEC QL CULT: SIGNIFICANT CHANGE UP

## 2018-10-25 ENCOUNTER — APPOINTMENT (OUTPATIENT)
Dept: UROLOGY | Facility: CLINIC | Age: 73
End: 2018-10-25
Payer: MEDICARE

## 2018-10-25 VITALS
RESPIRATION RATE: 17 BRPM | HEART RATE: 80 BPM | DIASTOLIC BLOOD PRESSURE: 94 MMHG | TEMPERATURE: 98 F | SYSTOLIC BLOOD PRESSURE: 133 MMHG

## 2018-10-25 PROCEDURE — 99024 POSTOP FOLLOW-UP VISIT: CPT

## 2019-02-12 ENCOUNTER — APPOINTMENT (OUTPATIENT)
Dept: UROLOGY | Facility: CLINIC | Age: 74
End: 2019-02-12
Payer: MEDICARE

## 2019-02-12 VITALS
DIASTOLIC BLOOD PRESSURE: 77 MMHG | TEMPERATURE: 98 F | RESPIRATION RATE: 17 BRPM | HEART RATE: 73 BPM | SYSTOLIC BLOOD PRESSURE: 113 MMHG

## 2019-02-12 DIAGNOSIS — N39.41 URGE INCONTINENCE: ICD-10-CM

## 2019-02-12 DIAGNOSIS — N40.1 BENIGN PROSTATIC HYPERPLASIA WITH LOWER URINARY TRACT SYMPMS: ICD-10-CM

## 2019-02-12 DIAGNOSIS — Z87.898 PERSONAL HISTORY OF OTHER SPECIFIED CONDITIONS: ICD-10-CM

## 2019-02-12 DIAGNOSIS — R33.8 BENIGN PROSTATIC HYPERPLASIA WITH LOWER URINARY TRACT SYMPMS: ICD-10-CM

## 2019-02-12 PROCEDURE — 99214 OFFICE O/P EST MOD 30 MIN: CPT

## 2019-02-12 RX ORDER — TROSPIUM CHLORIDE 20 MG/1
20 TABLET, FILM COATED ORAL
Qty: 30 | Refills: 6 | Status: ACTIVE | COMMUNITY
Start: 2019-02-12 | End: 1900-01-01

## 2019-02-12 RX ORDER — OXYBUTYNIN CHLORIDE 5 MG/1
5 TABLET, EXTENDED RELEASE ORAL
Qty: 90 | Refills: 3 | Status: COMPLETED | COMMUNITY
Start: 2018-10-25 | End: 2019-02-12

## 2019-03-04 NOTE — HISTORY OF PRESENT ILLNESS
[FreeTextEntry1] : s/p TURP Sept 2018\par Path: benign, 35 grams.\par \par Doing well. Here for 6 month follow up visit.  Flow much improved since prior to surgery.\par Primary complaint is intermittent urge incontinence at night and nocturia.  Previously treated with oxybutynin ER 5 mg qHS but no very effective and so he discontinued the medication.\par \par No abdominal or flank pain.\par No gross hematuria, dysuria.  No stress incontinence.\par

## 2019-03-04 NOTE — PHYSICAL EXAM
[General Appearance - Well Developed] : well developed [General Appearance - Well Nourished] : well nourished [Normal Appearance] : normal appearance [Well Groomed] : well groomed [General Appearance - In No Acute Distress] : no acute distress [Abdomen Soft] : soft [Abdomen Tenderness] : non-tender [Costovertebral Angle Tenderness] : no ~M costovertebral angle tenderness [Urinary Bladder Findings] : the bladder was normal on palpation [Edema] : no peripheral edema [] : no respiratory distress [Respiration, Rhythm And Depth] : normal respiratory rhythm and effort [Exaggerated Use Of Accessory Muscles For Inspiration] : no accessory muscle use [Oriented To Time, Place, And Person] : oriented to person, place, and time [Not Anxious] : not anxious [Normal Station and Gait] : the gait and station were normal for the patient's age

## 2019-03-04 NOTE — ASSESSMENT
[FreeTextEntry1] : Recommend to start trospium 20 mg once daily at bedtime.\par Goals of medication reviewed.  Discussed the potential adverse effects of the medication.  Discussed the proper administration of the medication.\par Follow up in 3 months.

## 2019-05-14 ENCOUNTER — APPOINTMENT (OUTPATIENT)
Dept: UROLOGY | Facility: CLINIC | Age: 74
End: 2019-05-14

## 2023-06-02 NOTE — H&P PST ADULT - SKIN/BREAST
negative Cephalexin Counseling: I counseled the patient regarding use of cephalexin as an antibiotic for prophylactic and/or therapeutic purposes. Cephalexin (commonly prescribed under brand name Keflex) is a cephalosporin antibiotic which is active against numerous classes of bacteria, including most skin bacteria. Side effects may include nausea, diarrhea, gastrointestinal upset, rash, hives, yeast infections, and in rare cases, hepatitis, kidney disease, seizures, fever, confusion, neurologic symptoms, and others. Patients with severe allergies to penicillin medications are cautioned that there is about a 10% incidence of cross-reactivity with cephalosporins. When possible, patients with penicillin allergies should use alternatives to cephalosporins for antibiotic therapy.

## 2024-02-13 NOTE — ASU PREOP CHECKLIST - HEIGHT IN FEET
Thank you for choosing [facility]. It was a pleasure to see you for your office visit today.     If you have any questions or scheduling needs during regular office hours, please call: [phone]  If urgent concerns arise after hours, you can call [phone] and ask to speak to the pediatric specialist on call.   If you need to schedule Imaging/Radiology tests, please call: [phone]  Goodreads messages are for routine communication and questions and are usually answered within 48-72 hours. If you have an urgent concern or require sooner response, please call us.  Outside lab and imaging results should be faxed to [phone].  If you go to a lab outside of [facility] we will not automatically get those results. You will need to ask to have them faxed.   You may receive a survey regarding your experience with the clinic today. We would appreciate your feedback.   We encourage to you make your follow-up today to ensure a timely appointment. If you are unable to do so please reach out to [phone] as soon as possible.       If you had any blood work, imaging or other tests completed today:  Normal test results will be mailed to your home address in a letter.  Abnormal results will be communicated to you via phone call/letter.  Please allow up to 1-2 weeks for processing and interpretation of most lab work.    6